# Patient Record
Sex: MALE | Race: BLACK OR AFRICAN AMERICAN | HISPANIC OR LATINO | URBAN - METROPOLITAN AREA
[De-identification: names, ages, dates, MRNs, and addresses within clinical notes are randomized per-mention and may not be internally consistent; named-entity substitution may affect disease eponyms.]

---

## 2019-05-15 PROBLEM — 441988000 IMAGING OF ABDOMEN ABNORMAL: Status: ACTIVE | Noted: 2019-05-15

## 2020-06-22 ENCOUNTER — OFFICE VISIT (OUTPATIENT)
Dept: URBAN - METROPOLITAN AREA CLINIC 35 | Facility: CLINIC | Age: 55
End: 2020-06-22

## 2020-06-22 VITALS
DIASTOLIC BLOOD PRESSURE: 68 MMHG | SYSTOLIC BLOOD PRESSURE: 138 MMHG | WEIGHT: 145 LBS | BODY MASS INDEX: 21.48 KG/M2 | HEIGHT: 69 IN

## 2020-06-22 RX ORDER — METFORMIN HYDROCHLORIDE 500 MG/1
TK 2 TS PO BID TABLET ORAL
Qty: 120 UNSPECIFIED | Refills: 0 | Status: DISCONTINUED | COMMUNITY

## 2020-06-22 NOTE — EXAM-MIGRATED EXAMINATIONS
GENERAL APPEARANCE: - alert, in no acute distress, well developed, well nourished;   HEAD: - normocephalic, atraumatic;   EYES: - SCLERA ECTERIC;   ORAL CAVITY: - mucosa moist;   THROAT: - clear;   HEART: - no murmurs, regular rate and rhythm, S1, S2 normal;   LUNGS: - clear to auscultation bilaterally, good air movement, no wheezes, rales, rhonchi;   ABDOMEN: - bowel sounds present, no masses palpable, no organomegaly , no rebound tenderness, soft, nontender, nondistended;

## 2020-06-22 NOTE — HPI-MIGRATED HPI
;     Abnormal ultrasound : Patient presents for Cirrhosis follow up. Patient denies having MRI due to approval . Patient is here today to get new MRI and DR Kaur states he will get it approved . Patient denies any abdominal pain at this time . Patient deneis nause or vomiting . He does mention gas / bloating more than before . Patient also had labs completed Friday with PCP we dont have results yet .     Patient admits drinking alcohol 1 x a week . Patient does have jaundice .  Patient denies fever ,chills , RUQ pains, dizziness, easy bruising, fatigue.        Of last visit (05/15/2019)  53 year old male patient presents for Abnormal Utrsound and Elevte LFT consult. Patient was diagnosed with Hepatopenomegy and fatty liver in 2016. Patient had last Liver biopsy in 2013 with results noted below. Patient currenty admits weight loss and jaundice .  Calculated MELD Score 17.   Patient ANI score 8.622, Probabity o Alchoholic disease 100%.     Patient currently denies chills, RUQ pains, dizziness, easy bruising, fatigue.   RISK FACTORS: Denies Alcohol, drugs, travel outside the [place], NSAIDs, protein supplements, tattoos, piercings,  service, blood transfusion, family history of liver disease or cancer, personal exposure to liver diseases, longterm, rehab    Outside Records recieved and reviewed: (04/10/2019) Hepatitis Panel: Negative  CBC: RBC is LOW at 3.55, MCH is HIGH at 35, Platelet Count is LOW at 79, all other values within normal range.   CMP: Creatnine is LOW at 0.3, B/C ratio is HIGH at 50, Glucose is VERY HIGH at 397, sodium is LOW at 134, albumin is LOW at 3.2, Globulin is HIGH at 4.8, ALK Phos is CRITICAL HIGH at 468, AST is HIGH at 127, ALT is HIGH at 90, Bilirubin, Total is CRITICAL HIGH at 10.10, All other values are normal range .    ABDOMINAL U/S(04/26/2019)  IMPRESSION:  1. Hepatomegaly, with mild fatty liver. 2.Splenomegaly 3.Small volume of ascites .;

## 2020-08-03 ENCOUNTER — TELEPHONE ENCOUNTER (OUTPATIENT)
Dept: URBAN - METROPOLITAN AREA CLINIC 37 | Facility: CLINIC | Age: 55
End: 2020-08-03

## 2020-08-05 ENCOUNTER — OFFICE VISIT (OUTPATIENT)
Dept: URBAN - METROPOLITAN AREA CLINIC 37 | Facility: CLINIC | Age: 55
End: 2020-08-05

## 2020-08-05 NOTE — HPI-MIGRATED HPI
;     Abnormal ultrasound : Patient presents for Cirrhosis follow up. Patient denies having MRI due to approval . Patient is here today to get new MRI and DR Kaur states he will get it approved . Patient denies any abdominal pain at this time . Patient deneis nause or vomiting . He does mention gas / bloating more than before . Patient also had labs completed Friday with PCP we dont have results yet .     Patient admits drinking alcohol 1 x a week . Patient does have jaundice .  Patient denies fever ,chills , RUQ pains, dizziness, easy bruising, fatigue.        Of last visit (05/15/2019)  53 year old male patient presents for Abnormal Utrsound and Elevte LFT consult. Patient was diagnosed with Hepatopenomegy and fatty liver in 2016. Patient had last Liver biopsy in 2013 with results noted below. Patient currenty admits weight loss and jaundice .  Calculated MELD Score 17.   Patient ANI score 8.622, Probabity o Alchoholic disease 100%.     Patient currently denies chills, RUQ pains, dizziness, easy bruising, fatigue.   RISK FACTORS: Denies Alcohol, drugs, travel outside the [place], NSAIDs, protein supplements, tattoos, piercings,  service, blood transfusion, family history of liver disease or cancer, personal exposure to liver diseases, nursing home, rehab    Outside Records recieved and reviewed: (04/10/2019) Hepatitis Panel: Negative  CBC: RBC is LOW at 3.55, MCH is HIGH at 35, Platelet Count is LOW at 79, all other values within normal range.   CMP: Creatnine is LOW at 0.3, B/C ratio is HIGH at 50, Glucose is VERY HIGH at 397, sodium is LOW at 134, albumin is LOW at 3.2, Globulin is HIGH at 4.8, ALK Phos is CRITICAL HIGH at 468, AST is HIGH at 127, ALT is HIGH at 90, Bilirubin, Total is CRITICAL HIGH at 10.10, All other values are normal range .    ABDOMINAL U/S(04/26/2019)  IMPRESSION:  1. Hepatomegaly, with mild fatty liver. 2.Splenomegaly 3.Small volume of ascites .;

## 2020-08-19 ENCOUNTER — OFFICE VISIT (OUTPATIENT)
Dept: URBAN - METROPOLITAN AREA CLINIC 37 | Facility: CLINIC | Age: 55
End: 2020-08-19

## 2020-08-19 ENCOUNTER — TELEPHONE ENCOUNTER (OUTPATIENT)
Dept: URBAN - METROPOLITAN AREA CLINIC 35 | Facility: CLINIC | Age: 55
End: 2020-08-19

## 2020-08-19 NOTE — HPI-MIGRATED HPI
;     Abnormal ultrasound : Patient presents today for an abnormal MRI follow up. MRI was performed 08/12/2020. Findings showed Cirrhotic liver and splenomegaly.   Patient admits/denies continuing alcohol 1 x a week. Patient denies fever, chills, RUQ pains, dizziness, easy bruising, or fatigue. Patient denies nausea or vomiting.  Of last visit (08/05/2020) Patient presents for Cirrhosis follow up. Patient denies having MRI due to approval . Patient is here today to get new MRI and DR Kaur states he will get it approved . Patient denies any abdominal pain at this time . Patient deneis nause or vomiting . He does mention gas / bloating more than before . Patient also had labs completed Friday with PCP we dont have results yet .     Patient admits drinking alcohol 1 x a week . Patient does have jaundice .  Patient denies fever ,chills , RUQ pains, dizziness, easy bruising, fatigue.        Of last visit (05/15/2019)  53 year old male patient presents for Abnormal Utrsound and Elevte LFT consult. Patient was diagnosed with Hepatopenomegy and fatty liver in 2016. Patient had last Liver biopsy in 2013 with results noted below. Patient currenty admits weight loss and jaundice .  Calculated MELD Score 17.   Patient ANI score 8.622, Probabity o Alchoholic disease 100%.     Patient currently denies chills, RUQ pains, dizziness, easy bruising, fatigue.   RISK FACTORS: Denies Alcohol, drugs, travel outside the [place], NSAIDs, protein supplements, tattoos, piercings,  service, blood transfusion, family history of liver disease or cancer, personal exposure to liver diseases, skilled nursing, rehab    Outside Records recieved and reviewed: (04/10/2019) Hepatitis Panel: Negative  CBC: RBC is LOW at 3.55, MCH is HIGH at 35, Platelet Count is LOW at 79, all other values within normal range.   CMP: Creatnine is LOW at 0.3, B/C ratio is HIGH at 50, Glucose is VERY HIGH at 397, sodium is LOW at 134, albumin is LOW at 3.2, Globulin is HIGH at 4.8, ALK Phos is CRITICAL HIGH at 468, AST is HIGH at 127, ALT is HIGH at 90, Bilirubin, Total is CRITICAL HIGH at 10.10, All other values are normal range .    ABDOMINAL U/S(04/26/2019)  IMPRESSION:  1. Hepatomegaly, with mild fatty liver. 2.Splenomegaly 3.Small volume of ascites .;

## 2020-08-28 ENCOUNTER — OFFICE VISIT (OUTPATIENT)
Dept: URBAN - METROPOLITAN AREA CLINIC 35 | Facility: CLINIC | Age: 55
End: 2020-08-28

## 2020-08-28 VITALS
SYSTOLIC BLOOD PRESSURE: 139 MMHG | WEIGHT: 145 LBS | DIASTOLIC BLOOD PRESSURE: 68 MMHG | HEIGHT: 69 IN | HEART RATE: 85 BPM | BODY MASS INDEX: 21.48 KG/M2

## 2020-08-28 NOTE — EXAM-MIGRATED EXAMINATIONS
GENERAL APPEARANCE: - alert, in no acute distress, well developed, well nourished;   EYES: - sclera Icteric;   ORAL CAVITY: - mucosa moist;   THROAT: - clear;   NECK/THYROID: - neck supple, full range of motion, no cervical lymphadenopathy, no thyroid nodules, no thyromegaly, trachea midline;   HEART: - no murmurs, regular rate and rhythm, S1, S2 normal;   LUNGS: - clear to auscultation bilaterally, good air movement, no wheezes, rales, rhonchi;   ABDOMEN: - bowel sounds present, no masses palpable, no organomegaly , no rebound tenderness, soft, nontender, nondistended;

## 2020-08-28 NOTE — HPI-MIGRATED HPI
;     Abnormal ultrasound : Patient presents today for an abnormal MRI follow up. MRI was performed 08/12/2020. Findings showed Cirrhotic liver and splenomegaly.   Patient denies continuing alcohol 1 x a week. Patient denies fever, chills, RUQ pains, dizziness, easy bruising, or fatigue. Patient denies nausea or vomiting.  Patient admits difficulty staying asleep at night, otherwise no new concerns.   Of last visit (08/05/2020) Patient presents for Cirrhosis follow up. Patient denies having MRI due to approval . Patient is here today to get new MRI and DR Kaur states he will get it approved . Patient denies any abdominal pain at this time . Patient deneis nause or vomiting . He does mention gas / bloating more than before . Patient also had labs completed Friday with PCP we dont have results yet .     Patient admits drinking alcohol 1 x a week . Patient does have jaundice .  Patient denies fever ,chills , RUQ pains, dizziness, easy bruising, fatigue.        Of last visit (05/15/2019)  53 year old male patient presents for Abnormal Utrsound and Elevte LFT consult. Patient was diagnosed with Hepatopenomegy and fatty liver in 2016. Patient had last Liver biopsy in 2013 with results noted below. Patient currenty admits weight loss and jaundice .  Calculated MELD Score 17.   Patient ANI score 8.622, Probabity o Alchoholic disease 100%.     Patient currently denies chills, RUQ pains, dizziness, easy bruising, fatigue.   RISK FACTORS: Denies Alcohol, drugs, travel outside the [place], NSAIDs, protein supplements, tattoos, piercings,  service, blood transfusion, family history of liver disease or cancer, personal exposure to liver diseases, assisted, rehab    Outside Records recieved and reviewed: (04/10/2019) Hepatitis Panel: Negative  CBC: RBC is LOW at 3.55, MCH is HIGH at 35, Platelet Count is LOW at 79, all other values within normal range.   CMP: Creatnine is LOW at 0.3, B/C ratio is HIGH at 50, Glucose is VERY HIGH at 397, sodium is LOW at 134, albumin is LOW at 3.2, Globulin is HIGH at 4.8, ALK Phos is CRITICAL HIGH at 468, AST is HIGH at 127, ALT is HIGH at 90, Bilirubin, Total is CRITICAL HIGH at 10.10, All other values are normal range .    ABDOMINAL U/S(04/26/2019)  IMPRESSION:  1. Hepatomegaly, with mild fatty liver. 2.Splenomegaly 3.Small volume of ascites .;

## 2020-10-14 ENCOUNTER — TELEPHONE ENCOUNTER (OUTPATIENT)
Dept: URBAN - METROPOLITAN AREA CLINIC 35 | Facility: CLINIC | Age: 55
End: 2020-10-14

## 2020-10-16 ENCOUNTER — OFFICE VISIT (OUTPATIENT)
Dept: URBAN - METROPOLITAN AREA CLINIC 35 | Facility: CLINIC | Age: 55
End: 2020-10-16

## 2020-11-18 ENCOUNTER — OFFICE VISIT (OUTPATIENT)
Dept: URBAN - METROPOLITAN AREA CLINIC 35 | Facility: CLINIC | Age: 55
End: 2020-11-18

## 2020-11-18 VITALS
WEIGHT: 140 LBS | BODY MASS INDEX: 20.73 KG/M2 | HEIGHT: 69 IN | TEMPERATURE: 98 F | HEART RATE: 95 BPM | OXYGEN SATURATION: 97 % | SYSTOLIC BLOOD PRESSURE: 146 MMHG | DIASTOLIC BLOOD PRESSURE: 68 MMHG

## 2020-11-18 RX ORDER — MULTIVIT-MIN/IRON/FOLIC ACID/K 18-600-40
AS DIRECTED CAPSULE ORAL
Status: ACTIVE | COMMUNITY

## 2020-11-18 NOTE — HPI-MIGRATED HPI
;     Abnormal ultrasound : Patient presents for follow up. Patient admits his last alcoholic drink was 2 weeks ago. Patient has increased MELD score according to Dr Ovalle of [place] .   Patient is Jaundice and has been experiencing bruising .  He admits intermittent abdominal pain.   he denies nausea or vomitng . He admits he is still having trouble sleeping .   Patient denies fever, chills, RUQ pains, dizziness, or fatigue.   Of last visit (08/28/2020) Patient presents today for an abnormal MRI follow up. MRI was performed 08/12/2020. Findings showed Cirrhotic liver and splenomegaly.   Patient denies continuing alcohol 1 x a week. Patient denies fever, chills, RUQ pains, dizziness, easy bruising, or fatigue. Patient denies nausea or vomiting.  Patient admits difficulty staying asleep at night, otherwise no new concerns.   Of last visit (08/05/2020) Patient presents for Cirrhosis follow up. Patient denies having MRI due to approval . Patient is here today to get new MRI and DR Kaur states he will get it approved . Patient denies any abdominal pain at this time . Patient deneis nause or vomiting . He does mention gas / bloating more than before . Patient also had labs completed Friday with PCP we dont have results yet .     Patient admits drinking alcohol 1 x a week . Patient does have jaundice .  Patient denies fever ,chills , RUQ pains, dizziness, easy bruising, fatigue.        Of last visit (05/15/2019)  53 year old male patient presents for Abnormal Utrsound and Elevte LFT consult. Patient was diagnosed with Hepatopenomegy and fatty liver in 2016. Patient had last Liver biopsy in 2013 with results noted below. Patient currenty admits weight loss and jaundice .  Calculated MELD Score 17.   Patient ANI score 8.622, Probabity o Alchoholic disease 100%.     Patient currently denies chills, RUQ pains, dizziness, easy bruising, fatigue.   RISK FACTORS: Denies Alcohol, drugs, travel outside the [place], NSAIDs, protein supplements, tattoos, piercings,  service, blood transfusion, family history of liver disease or cancer, personal exposure to liver diseases, assisted, rehab    Outside Records recieved and reviewed: (04/10/2019) Hepatitis Panel: Negative  CBC: RBC is LOW at 3.55, MCH is HIGH at 35, Platelet Count is LOW at 79, all other values within normal range.   CMP: Creatnine is LOW at 0.3, B/C ratio is HIGH at 50, Glucose is VERY HIGH at 397, sodium is LOW at 134, albumin is LOW at 3.2, Globulin is HIGH at 4.8, ALK Phos is CRITICAL HIGH at 468, AST is HIGH at 127, ALT is HIGH at 90, Bilirubin, Total is CRITICAL HIGH at 10.10, All other values are normal range .    ABDOMINAL U/S(04/26/2019)  IMPRESSION:  1. Hepatomegaly, with mild fatty liver. 2.Splenomegaly 3.Small volume of ascites .;

## 2020-11-18 NOTE — EXAM-MIGRATED EXAMINATIONS
GENERAL APPEARANCE: - alert, in no acute distress, well developed, well nourished;   EYES: - icteric;   ABDOMEN: - bowel sounds present, no masses palpable, no organomegaly , no rebound tenderness, soft, nontender, nondistended;

## 2022-11-11 ENCOUNTER — OUT OF OFFICE VISIT (OUTPATIENT)
Dept: URBAN - METROPOLITAN AREA MEDICAL CENTER 24 | Facility: MEDICAL CENTER | Age: 57
End: 2022-11-11
Payer: COMMERCIAL

## 2022-11-11 DIAGNOSIS — K31.89 ACQUIRED DEFORMITY OF DUODENUM: ICD-10-CM

## 2022-11-11 DIAGNOSIS — I85.10 ESOPH VARICE OTHER DIS: ICD-10-CM

## 2022-11-11 DIAGNOSIS — K70.30 ALC (ALCOHOLIC LIVER CIRRHOSIS): ICD-10-CM

## 2022-11-11 DIAGNOSIS — R41.82 ACUTE ON CHRONIC ALTERATION IN MENTAL STATUS: ICD-10-CM

## 2022-11-11 DIAGNOSIS — D53.8 OTHER SPECIFIED NUTRITIONAL ANEMIAS: ICD-10-CM

## 2022-11-11 PROCEDURE — G8427 DOCREV CUR MEDS BY ELIG CLIN: HCPCS | Performed by: INTERNAL MEDICINE

## 2022-11-11 PROCEDURE — 43244 EGD VARICES LIGATION: CPT | Performed by: INTERNAL MEDICINE

## 2022-11-11 PROCEDURE — 99222 1ST HOSP IP/OBS MODERATE 55: CPT | Performed by: INTERNAL MEDICINE

## 2022-12-16 ENCOUNTER — TELEPHONE ENCOUNTER (OUTPATIENT)
Dept: URBAN - METROPOLITAN AREA CLINIC 6 | Facility: CLINIC | Age: 57
End: 2022-12-16

## 2022-12-22 ENCOUNTER — OFFICE VISIT (OUTPATIENT)
Dept: URBAN - METROPOLITAN AREA CLINIC 82 | Facility: CLINIC | Age: 57
End: 2022-12-22
Payer: COMMERCIAL

## 2022-12-22 VITALS
RESPIRATION RATE: 20 BRPM | WEIGHT: 129 LBS | SYSTOLIC BLOOD PRESSURE: 120 MMHG | HEIGHT: 69 IN | HEART RATE: 80 BPM | BODY MASS INDEX: 19.11 KG/M2 | DIASTOLIC BLOOD PRESSURE: 76 MMHG | TEMPERATURE: 97.9 F

## 2022-12-22 DIAGNOSIS — R94.5 ABNORMAL RESULTS OF LIVER FUNCTION STUDIES: ICD-10-CM

## 2022-12-22 DIAGNOSIS — R16.2 HEPATOMEGALY WITH SPLENOMEGALY, NOT ELSEWHERE CLASSIFIED: ICD-10-CM

## 2022-12-22 DIAGNOSIS — K70.30 ALCOHOLIC CIRRHOSIS OF LIVER WITHOUT ASCITES: ICD-10-CM

## 2022-12-22 PROCEDURE — 99214 OFFICE O/P EST MOD 30 MIN: CPT | Performed by: INTERNAL MEDICINE

## 2022-12-22 RX ORDER — MULTIVIT-MIN/IRON/FOLIC ACID/K 18-600-40
AS DIRECTED CAPSULE ORAL
Status: ACTIVE | COMMUNITY

## 2022-12-22 NOTE — HPI-MIGRATED HPI
The patient is following up from recent hospitalization where he had EGD with banding of large esophageal varices.  Currently doing well with no evidence of bleeding.  Has been seen by Dr. Amos.     Abnormal ultrasound : Patient presents for follow up. Patient admits his last alcoholic drink was 2 weeks ago. Patient has increased MELD score according to Dr Ovalle of [place] .   Patient is Jaundice and has been experiencing bruising .  He admits intermittent abdominal pain.   he denies nausea or vomitng . He admits he is still having trouble sleeping .   Patient denies fever, chills, RUQ pains, dizziness, or fatigue.   Of last visit (08/28/2020) Patient presents today for an abnormal MRI follow up. MRI was performed 08/12/2020. Findings showed Cirrhotic liver and splenomegaly.   Patient denies continuing alcohol 1 x a week. Patient denies fever, chills, RUQ pains, dizziness, easy bruising, or fatigue. Patient denies nausea or vomiting.  Patient admits difficulty staying asleep at night, otherwise no new concerns.   Of last visit (08/05/2020) Patient presents for Cirrhosis follow up. Patient denies having MRI due to approval . Patient is here today to get new MRI and DR Kaur states he will get it approved . Patient denies any abdominal pain at this time . Patient deneis nause or vomiting . He does mention gas / bloating more than before . Patient also had labs completed Friday with PCP we dont have results yet .     Patient admits drinking alcohol 1 x a week . Patient does have jaundice .  Patient denies fever ,chills , RUQ pains, dizziness, easy bruising, fatigue.        Of last visit (05/15/2019)  53 year old male patient presents for Abnormal Utrsound and Elevte LFT consult. Patient was diagnosed with Hepatopenomegy and fatty liver in 2016. Patient had last Liver biopsy in 2013 with results noted below. Patient currenty admits weight loss and jaundice .  Calculated MELD Score 17.   Patient ANI score 8.622, Probabity o Alchoholic disease 100%.     Patient currently denies chills, RUQ pains, dizziness, easy bruising, fatigue.   RISK FACTORS: Denies Alcohol, drugs, travel outside the [place], NSAIDs, protein supplements, tattoos, piercings,  service, blood transfusion, family history of liver disease or cancer, personal exposure to liver diseases, [facility], rehab    Outside Records recieved and reviewed: (04/10/2019) Hepatitis Panel: Negative  CBC: RBC is LOW at 3.55, MCH is HIGH at 35, Platelet Count is LOW at 79, all other values within normal range.   CMP: Creatnine is LOW at 0.3, B/C ratio is HIGH at 50, Glucose is VERY HIGH at 397, sodium is LOW at 134, albumin is LOW at 3.2, Globulin is HIGH at 4.8, ALK Phos is CRITICAL HIGH at 468, AST is HIGH at 127, ALT is HIGH at 90, Bilirubin, Total is CRITICAL HIGH at 10.10, All other values are normal range .    ABDOMINAL U/S(04/26/2019)  IMPRESSION:  1. Hepatomegaly, with mild fatty liver. 2.Splenomegaly 3.Small volume of ascites .;

## 2023-01-19 ENCOUNTER — OFFICE VISIT (OUTPATIENT)
Dept: URBAN - METROPOLITAN AREA MEDICAL CENTER 24 | Facility: MEDICAL CENTER | Age: 58
End: 2023-01-19
Payer: COMMERCIAL

## 2023-01-19 DIAGNOSIS — I85.10 ESOPH VARICE OTHER DIS: ICD-10-CM

## 2023-01-19 DIAGNOSIS — K31.89 ACQUIRED DEFORMITY OF DUODENUM: ICD-10-CM

## 2023-01-19 DIAGNOSIS — K76.6 CLINICALLY SIGNIFICANT PORTAL HYPERTENSION: ICD-10-CM

## 2023-01-19 PROCEDURE — 43244 EGD VARICES LIGATION: CPT | Performed by: INTERNAL MEDICINE

## 2023-01-30 ENCOUNTER — TELEPHONE ENCOUNTER (OUTPATIENT)
Dept: URBAN - METROPOLITAN AREA CLINIC 42 | Facility: CLINIC | Age: 58
End: 2023-01-30

## 2023-02-23 ENCOUNTER — OFFICE VISIT (OUTPATIENT)
Dept: URBAN - METROPOLITAN AREA CLINIC 82 | Facility: CLINIC | Age: 58
End: 2023-02-23
Payer: COMMERCIAL

## 2023-02-23 ENCOUNTER — DASHBOARD ENCOUNTERS (OUTPATIENT)
Age: 58
End: 2023-02-23

## 2023-02-23 ENCOUNTER — TELEPHONE ENCOUNTER (OUTPATIENT)
Dept: URBAN - METROPOLITAN AREA CLINIC 82 | Facility: CLINIC | Age: 58
End: 2023-02-23

## 2023-02-23 VITALS
TEMPERATURE: 97.5 F | BODY MASS INDEX: 20.73 KG/M2 | RESPIRATION RATE: 16 BRPM | HEART RATE: 85 BPM | DIASTOLIC BLOOD PRESSURE: 67 MMHG | SYSTOLIC BLOOD PRESSURE: 110 MMHG | WEIGHT: 140 LBS | HEIGHT: 69 IN

## 2023-02-23 DIAGNOSIS — R16.2 HEPATOMEGALY WITH SPLENOMEGALY, NOT ELSEWHERE CLASSIFIED: ICD-10-CM

## 2023-02-23 DIAGNOSIS — K70.30 ALCOHOLIC CIRRHOSIS OF LIVER WITHOUT ASCITES: ICD-10-CM

## 2023-02-23 DIAGNOSIS — R94.5 ABNORMAL RESULTS OF LIVER FUNCTION STUDIES: ICD-10-CM

## 2023-02-23 PROBLEM — 166603001 ABNORMAL RESULTS OF LIVER FUNCTION STUDIES: Status: ACTIVE | Noted: 2019-05-15

## 2023-02-23 PROBLEM — 36760000 HEPATOSPLENOMEGALY: Status: ACTIVE | Noted: 2019-05-15

## 2023-02-23 PROCEDURE — 99214 OFFICE O/P EST MOD 30 MIN: CPT | Performed by: INTERNAL MEDICINE

## 2023-02-23 RX ORDER — FUROSEMIDE 80 MG/1
1 TABLET TABLET ORAL ONCE A DAY
Qty: 30 | Refills: 6 | OUTPATIENT

## 2023-02-23 RX ORDER — SPIRONOLACTONE 100 MG/1
1 TABLET TABLET, FILM COATED ORAL ONCE A DAY
Qty: 30 | Refills: 6 | OUTPATIENT

## 2023-02-23 RX ORDER — MULTIVIT-MIN/IRON/FOLIC ACID/K 18-600-40
AS DIRECTED CAPSULE ORAL
Status: ACTIVE | COMMUNITY

## 2023-02-23 RX ORDER — ATORVASTATIN CALCIUM 20 MG/1
1 TABLET TABLET, FILM COATED ORAL ONCE A DAY
Status: ACTIVE | COMMUNITY

## 2023-02-23 RX ORDER — LACTULOSE 10 G/15ML
15 ML AS NEEDED SOLUTION ORAL ONCE A DAY
Status: ACTIVE | COMMUNITY

## 2023-02-23 RX ORDER — INSULIN GLARGINE 100 [IU]/ML
AS DIRECTED INJECTION, SOLUTION SUBCUTANEOUS
Status: ACTIVE | COMMUNITY

## 2023-02-23 RX ORDER — INSULIN LISPRO 100 [IU]/ML
AS DIRECTED INJECTION, SOLUTION INTRAVENOUS; SUBCUTANEOUS
Status: ACTIVE | COMMUNITY

## 2023-02-23 NOTE — HPI-TODAY'S VISIT:
The patient is a 58 yo male following up after recent EGD with banding.  EGD showed significant improvement in the amount of varices present.  Getting transplant evaluation at [place].

## 2023-02-24 PROBLEM — 420054005 ALCOHOLIC CIRRHOSIS: Status: ACTIVE | Noted: 2020-08-28

## 2023-03-07 ENCOUNTER — OFFICE VISIT (OUTPATIENT)
Dept: URBAN - METROPOLITAN AREA SURGERY CENTER 13 | Facility: SURGERY CENTER | Age: 58
End: 2023-03-07

## 2023-03-16 ENCOUNTER — OFFICE VISIT (OUTPATIENT)
Dept: URBAN - METROPOLITAN AREA MEDICAL CENTER 24 | Facility: MEDICAL CENTER | Age: 58
End: 2023-03-16
Payer: COMMERCIAL

## 2023-03-16 DIAGNOSIS — D12.4 ADENOMA OF DESCENDING COLON: ICD-10-CM

## 2023-03-16 DIAGNOSIS — I85.10 ESOPH VARICE OTHER DIS: ICD-10-CM

## 2023-03-16 DIAGNOSIS — Z12.11 COLON CANCER SCREENING: ICD-10-CM

## 2023-03-16 DIAGNOSIS — K76.6 CLINICALLY SIGNIFICANT PORTAL HYPERTENSION: ICD-10-CM

## 2023-03-16 DIAGNOSIS — K31.89 ACQUIRED DEFORMITY OF DUODENUM: ICD-10-CM

## 2023-03-16 PROCEDURE — 43244 EGD VARICES LIGATION: CPT | Performed by: INTERNAL MEDICINE

## 2023-03-16 PROCEDURE — 45385 COLONOSCOPY W/LESION REMOVAL: CPT | Performed by: INTERNAL MEDICINE

## 2023-03-17 ENCOUNTER — TELEPHONE ENCOUNTER (OUTPATIENT)
Dept: URBAN - METROPOLITAN AREA CLINIC 42 | Facility: CLINIC | Age: 58
End: 2023-03-17

## 2023-03-22 ENCOUNTER — P2P PATIENT RECORD (OUTPATIENT)
Age: 58
End: 2023-03-22

## 2023-03-27 ENCOUNTER — OUT OF OFFICE VISIT (OUTPATIENT)
Dept: URBAN - METROPOLITAN AREA MEDICAL CENTER 24 | Facility: MEDICAL CENTER | Age: 58
End: 2023-03-27
Payer: COMMERCIAL

## 2023-03-27 DIAGNOSIS — K76.6 CLINICALLY SIGNIFICANT PORTAL HYPERTENSION: ICD-10-CM

## 2023-03-27 DIAGNOSIS — K70.30 ALC (ALCOHOLIC LIVER CIRRHOSIS): ICD-10-CM

## 2023-03-27 DIAGNOSIS — K92.0 BLOODY EMESIS: ICD-10-CM

## 2023-03-27 DIAGNOSIS — K31.89 ACQUIRED DEFORMITY OF DUODENUM: ICD-10-CM

## 2023-03-27 DIAGNOSIS — D62 ABLA (ACUTE BLOOD LOSS ANEMIA): ICD-10-CM

## 2023-03-27 PROCEDURE — 43235 EGD DIAGNOSTIC BRUSH WASH: CPT | Performed by: INTERNAL MEDICINE

## 2023-03-27 PROCEDURE — 99222 1ST HOSP IP/OBS MODERATE 55: CPT | Performed by: INTERNAL MEDICINE

## 2023-03-27 PROCEDURE — G8427 DOCREV CUR MEDS BY ELIG CLIN: HCPCS | Performed by: INTERNAL MEDICINE

## 2023-03-28 ENCOUNTER — TELEPHONE ENCOUNTER (OUTPATIENT)
Dept: URBAN - METROPOLITAN AREA CLINIC 35 | Facility: CLINIC | Age: 58
End: 2023-03-28

## 2023-03-28 ENCOUNTER — LAB OUTSIDE AN ENCOUNTER (OUTPATIENT)
Dept: URBAN - METROPOLITAN AREA CLINIC 35 | Facility: CLINIC | Age: 58
End: 2023-03-28

## 2023-03-28 ENCOUNTER — OUT OF OFFICE VISIT (OUTPATIENT)
Dept: URBAN - METROPOLITAN AREA MEDICAL CENTER 24 | Facility: MEDICAL CENTER | Age: 58
End: 2023-03-28
Payer: COMMERCIAL

## 2023-03-28 ENCOUNTER — TELEPHONE ENCOUNTER (OUTPATIENT)
Dept: URBAN - METROPOLITAN AREA CLINIC 92 | Facility: CLINIC | Age: 58
End: 2023-03-28

## 2023-03-28 DIAGNOSIS — K70.30 ALC (ALCOHOLIC LIVER CIRRHOSIS): ICD-10-CM

## 2023-03-28 DIAGNOSIS — D62 ABLA (ACUTE BLOOD LOSS ANEMIA): ICD-10-CM

## 2023-03-28 DIAGNOSIS — K92.0 BLOODY EMESIS: ICD-10-CM

## 2023-03-28 PROCEDURE — 99232 SBSQ HOSP IP/OBS MODERATE 35: CPT | Performed by: INTERNAL MEDICINE

## 2023-03-30 ENCOUNTER — OUT OF OFFICE VISIT (OUTPATIENT)
Dept: URBAN - METROPOLITAN AREA MEDICAL CENTER 24 | Facility: MEDICAL CENTER | Age: 58
End: 2023-03-30
Payer: COMMERCIAL

## 2023-03-30 DIAGNOSIS — K92.0 BLOODY EMESIS: ICD-10-CM

## 2023-03-30 DIAGNOSIS — D62 ABLA (ACUTE BLOOD LOSS ANEMIA): ICD-10-CM

## 2023-03-30 DIAGNOSIS — K76.82 ACUTE HEPATIC ENCEPHALOPATHY: ICD-10-CM

## 2023-03-30 DIAGNOSIS — K70.30 ALC (ALCOHOLIC LIVER CIRRHOSIS): ICD-10-CM

## 2023-03-30 PROCEDURE — 99232 SBSQ HOSP IP/OBS MODERATE 35: CPT | Performed by: INTERNAL MEDICINE

## 2023-03-31 ENCOUNTER — OUT OF OFFICE VISIT (OUTPATIENT)
Dept: URBAN - METROPOLITAN AREA MEDICAL CENTER 24 | Facility: MEDICAL CENTER | Age: 58
End: 2023-03-31
Payer: COMMERCIAL

## 2023-03-31 ENCOUNTER — TELEPHONE ENCOUNTER (OUTPATIENT)
Dept: URBAN - METROPOLITAN AREA CLINIC 92 | Facility: CLINIC | Age: 58
End: 2023-03-31

## 2023-03-31 DIAGNOSIS — D62 ABLA (ACUTE BLOOD LOSS ANEMIA): ICD-10-CM

## 2023-03-31 DIAGNOSIS — K92.0 BLOODY EMESIS: ICD-10-CM

## 2023-03-31 DIAGNOSIS — K70.30 ALC (ALCOHOLIC LIVER CIRRHOSIS): ICD-10-CM

## 2023-03-31 DIAGNOSIS — K76.82 ACUTE HEPATIC ENCEPHALOPATHY: ICD-10-CM

## 2023-03-31 PROCEDURE — 99232 SBSQ HOSP IP/OBS MODERATE 35: CPT | Performed by: INTERNAL MEDICINE

## 2023-04-01 ENCOUNTER — OUT OF OFFICE VISIT (OUTPATIENT)
Dept: URBAN - METROPOLITAN AREA MEDICAL CENTER 24 | Facility: MEDICAL CENTER | Age: 58
End: 2023-04-01

## 2023-04-01 ENCOUNTER — CLAIMS CREATED FROM THE CLAIM WINDOW (OUTPATIENT)
Dept: URBAN - METROPOLITAN AREA MEDICAL CENTER 24 | Facility: MEDICAL CENTER | Age: 58
End: 2023-04-01
Payer: COMMERCIAL

## 2023-04-01 DIAGNOSIS — D62 ABLA (ACUTE BLOOD LOSS ANEMIA): ICD-10-CM

## 2023-04-01 DIAGNOSIS — K92.0 BLOODY EMESIS: ICD-10-CM

## 2023-04-01 DIAGNOSIS — K76.82 ACUTE HEPATIC ENCEPHALOPATHY: ICD-10-CM

## 2023-04-01 DIAGNOSIS — K70.30 ALC (ALCOHOLIC LIVER CIRRHOSIS): ICD-10-CM

## 2023-04-01 PROCEDURE — 99232 SBSQ HOSP IP/OBS MODERATE 35: CPT | Performed by: INTERNAL MEDICINE

## 2023-05-01 ENCOUNTER — TELEPHONE ENCOUNTER (OUTPATIENT)
Dept: URBAN - METROPOLITAN AREA CLINIC 35 | Facility: CLINIC | Age: 58
End: 2023-05-01

## 2023-06-02 ENCOUNTER — CLAIMS CREATED FROM THE CLAIM WINDOW (OUTPATIENT)
Dept: URBAN - METROPOLITAN AREA MEDICAL CENTER 31 | Facility: MEDICAL CENTER | Age: 58
End: 2023-06-02
Payer: COMMERCIAL

## 2023-06-02 ENCOUNTER — CLAIMS CREATED FROM THE CLAIM WINDOW (OUTPATIENT)
Dept: URBAN - METROPOLITAN AREA MEDICAL CENTER 31 | Facility: MEDICAL CENTER | Age: 58
End: 2023-06-02

## 2023-06-02 DIAGNOSIS — K76.82 ACUTE HEPATIC ENCEPHALOPATHY: ICD-10-CM

## 2023-06-02 DIAGNOSIS — R11.2 ACUTE NAUSEA WITH NONBILIOUS VOMITING: ICD-10-CM

## 2023-06-02 DIAGNOSIS — K74.60 ADVANCED CIRRHOSIS: ICD-10-CM

## 2023-06-02 PROCEDURE — G8427 DOCREV CUR MEDS BY ELIG CLIN: HCPCS | Performed by: INTERNAL MEDICINE

## 2023-06-02 PROCEDURE — 99222 1ST HOSP IP/OBS MODERATE 55: CPT | Performed by: INTERNAL MEDICINE

## 2023-06-16 ENCOUNTER — OFFICE VISIT (OUTPATIENT)
Dept: URBAN - METROPOLITAN AREA MEDICAL CENTER 33 | Facility: MEDICAL CENTER | Age: 58
End: 2023-06-16
Payer: COMMERCIAL

## 2023-06-16 DIAGNOSIS — K31.89 ACQUIRED DEFORMITY OF DUODENUM: ICD-10-CM

## 2023-06-16 PROCEDURE — 43239 EGD BIOPSY SINGLE/MULTIPLE: CPT | Performed by: INTERNAL MEDICINE

## 2023-06-16 PROCEDURE — 43237 ENDOSCOPIC US EXAM ESOPH: CPT | Performed by: INTERNAL MEDICINE

## 2023-08-04 ENCOUNTER — TELEPHONE ENCOUNTER (OUTPATIENT)
Dept: URBAN - METROPOLITAN AREA CLINIC 105 | Facility: CLINIC | Age: 58
End: 2023-08-04

## 2025-01-20 ENCOUNTER — CLAIMS CREATED FROM THE CLAIM WINDOW (OUTPATIENT)
Dept: URBAN - METROPOLITAN AREA MEDICAL CENTER 33 | Facility: MEDICAL CENTER | Age: 60
End: 2025-01-20
Payer: COMMERCIAL

## 2025-01-20 DIAGNOSIS — K70.30 ALC (ALCOHOLIC LIVER CIRRHOSIS): ICD-10-CM

## 2025-01-20 DIAGNOSIS — D62 ABLA (ACUTE BLOOD LOSS ANEMIA): ICD-10-CM

## 2025-01-20 DIAGNOSIS — K92.1 ACUTE MELENA: ICD-10-CM

## 2025-01-20 PROCEDURE — 99223 1ST HOSP IP/OBS HIGH 75: CPT | Performed by: INTERNAL MEDICINE

## 2025-01-20 PROCEDURE — G8427 DOCREV CUR MEDS BY ELIG CLIN: HCPCS | Performed by: INTERNAL MEDICINE

## 2025-01-20 PROCEDURE — 99255 IP/OBS CONSLTJ NEW/EST HI 80: CPT | Performed by: INTERNAL MEDICINE

## 2025-01-21 ENCOUNTER — CLAIMS CREATED FROM THE CLAIM WINDOW (OUTPATIENT)
Dept: URBAN - METROPOLITAN AREA MEDICAL CENTER 33 | Facility: MEDICAL CENTER | Age: 60
End: 2025-01-21
Payer: COMMERCIAL

## 2025-01-21 DIAGNOSIS — K92.2 GASTROINTESTINAL HEMORRHAGE, UNSPECIFIED: ICD-10-CM

## 2025-01-21 PROCEDURE — 99233 SBSQ HOSP IP/OBS HIGH 50: CPT | Performed by: INTERNAL MEDICINE

## 2025-01-22 ENCOUNTER — CLAIMS CREATED FROM THE CLAIM WINDOW (OUTPATIENT)
Dept: URBAN - METROPOLITAN AREA MEDICAL CENTER 33 | Facility: MEDICAL CENTER | Age: 60
End: 2025-01-22
Payer: COMMERCIAL

## 2025-01-22 DIAGNOSIS — K92.2 GASTROINTESTINAL HEMORRHAGE, UNSPECIFIED: ICD-10-CM

## 2025-01-22 PROCEDURE — 99232 SBSQ HOSP IP/OBS MODERATE 35: CPT

## 2025-03-06 ENCOUNTER — CLAIMS CREATED FROM THE CLAIM WINDOW (OUTPATIENT)
Dept: URBAN - METROPOLITAN AREA MEDICAL CENTER 33 | Facility: MEDICAL CENTER | Age: 60
End: 2025-03-06
Payer: COMMERCIAL

## 2025-03-06 DIAGNOSIS — K92.2 GASTROINTESTINAL HEMORRHAGE, UNSPECIFIED: ICD-10-CM

## 2025-03-06 PROCEDURE — 99255 IP/OBS CONSLTJ NEW/EST HI 80: CPT | Performed by: INTERNAL MEDICINE

## 2025-03-07 ENCOUNTER — CLAIMS CREATED FROM THE CLAIM WINDOW (OUTPATIENT)
Dept: URBAN - METROPOLITAN AREA MEDICAL CENTER 33 | Facility: MEDICAL CENTER | Age: 60
End: 2025-03-07
Payer: COMMERCIAL

## 2025-03-07 DIAGNOSIS — K92.2 GASTROINTESTINAL HEMORRHAGE, UNSPECIFIED: ICD-10-CM

## 2025-03-07 PROCEDURE — 43235 EGD DIAGNOSTIC BRUSH WASH: CPT | Performed by: INTERNAL MEDICINE

## 2025-05-15 ENCOUNTER — CLAIMS CREATED FROM THE CLAIM WINDOW (OUTPATIENT)
Dept: URBAN - METROPOLITAN AREA MEDICAL CENTER 31 | Facility: MEDICAL CENTER | Age: 60
End: 2025-05-15
Payer: COMMERCIAL

## 2025-05-15 DIAGNOSIS — R41.82 ALTERED MENTAL STATUS: ICD-10-CM

## 2025-05-15 DIAGNOSIS — K62.5 RECTAL BLEEDING: ICD-10-CM

## 2025-05-15 DIAGNOSIS — R19.7 DIARRHEA: ICD-10-CM

## 2025-05-15 PROCEDURE — G8427 DOCREV CUR MEDS BY ELIG CLIN: HCPCS | Performed by: STUDENT IN AN ORGANIZED HEALTH CARE EDUCATION/TRAINING PROGRAM

## 2025-05-15 PROCEDURE — 99254 IP/OBS CNSLTJ NEW/EST MOD 60: CPT | Performed by: STUDENT IN AN ORGANIZED HEALTH CARE EDUCATION/TRAINING PROGRAM

## 2025-05-15 PROCEDURE — 99222 1ST HOSP IP/OBS MODERATE 55: CPT | Performed by: STUDENT IN AN ORGANIZED HEALTH CARE EDUCATION/TRAINING PROGRAM

## 2025-05-16 ENCOUNTER — CLAIMS CREATED FROM THE CLAIM WINDOW (OUTPATIENT)
Dept: URBAN - METROPOLITAN AREA MEDICAL CENTER 31 | Facility: MEDICAL CENTER | Age: 60
End: 2025-05-16

## 2025-05-16 PROCEDURE — 99232 SBSQ HOSP IP/OBS MODERATE 35: CPT | Performed by: INTERNAL MEDICINE

## 2025-07-19 ENCOUNTER — CLAIMS CREATED FROM THE CLAIM WINDOW (OUTPATIENT)
Dept: URBAN - METROPOLITAN AREA MEDICAL CENTER 31 | Facility: MEDICAL CENTER | Age: 60
End: 2025-07-19
Payer: COMMERCIAL

## 2025-07-19 DIAGNOSIS — K76.82 HEPATIC ENCEPHALOPATHY: ICD-10-CM

## 2025-07-19 DIAGNOSIS — K74.60 DECOMPENSATED CIRRHOSIS: ICD-10-CM

## 2025-07-19 PROCEDURE — 99222 1ST HOSP IP/OBS MODERATE 55: CPT | Performed by: INTERNAL MEDICINE

## 2025-07-19 PROCEDURE — 99254 IP/OBS CNSLTJ NEW/EST MOD 60: CPT | Performed by: INTERNAL MEDICINE

## 2025-07-19 PROCEDURE — G8427 DOCREV CUR MEDS BY ELIG CLIN: HCPCS | Performed by: INTERNAL MEDICINE

## 2025-07-20 ENCOUNTER — CLAIMS CREATED FROM THE CLAIM WINDOW (OUTPATIENT)
Dept: URBAN - METROPOLITAN AREA MEDICAL CENTER 31 | Facility: MEDICAL CENTER | Age: 60
End: 2025-07-20
Payer: COMMERCIAL

## 2025-07-20 DIAGNOSIS — K76.82 HEPATIC ENCEPHALOPATHY: ICD-10-CM

## 2025-07-20 DIAGNOSIS — K74.60 DECOMPENSATED CIRRHOSIS: ICD-10-CM

## 2025-07-20 PROCEDURE — 99232 SBSQ HOSP IP/OBS MODERATE 35: CPT | Performed by: INTERNAL MEDICINE

## 2025-07-21 ENCOUNTER — CLAIMS CREATED FROM THE CLAIM WINDOW (OUTPATIENT)
Dept: URBAN - METROPOLITAN AREA MEDICAL CENTER 31 | Facility: MEDICAL CENTER | Age: 60
End: 2025-07-21
Payer: COMMERCIAL

## 2025-07-21 DIAGNOSIS — K74.60 DECOMPENSATED CIRRHOSIS: ICD-10-CM

## 2025-07-21 DIAGNOSIS — K76.82 HEPATIC ENCEPHALOPATHY: ICD-10-CM

## 2025-07-21 DIAGNOSIS — D64.89 NORMOCYTIC ANEMIA: ICD-10-CM

## 2025-07-21 PROCEDURE — 99232 SBSQ HOSP IP/OBS MODERATE 35: CPT | Performed by: INTERNAL MEDICINE

## 2025-07-22 ENCOUNTER — CLAIMS CREATED FROM THE CLAIM WINDOW (OUTPATIENT)
Dept: URBAN - METROPOLITAN AREA MEDICAL CENTER 31 | Facility: MEDICAL CENTER | Age: 60
End: 2025-07-22
Payer: COMMERCIAL

## 2025-07-22 DIAGNOSIS — K76.82 HEPATIC ENCEPHALOPATHY: ICD-10-CM

## 2025-07-22 DIAGNOSIS — K74.60 DECOMPENSATED CIRRHOSIS: ICD-10-CM

## 2025-07-22 DIAGNOSIS — D64.89 NORMOCYTIC ANEMIA: ICD-10-CM

## 2025-07-22 PROCEDURE — 99232 SBSQ HOSP IP/OBS MODERATE 35: CPT | Performed by: INTERNAL MEDICINE

## 2025-08-14 ENCOUNTER — OFFICE VISIT (OUTPATIENT)
Dept: URBAN - METROPOLITAN AREA CLINIC 42 | Facility: CLINIC | Age: 60
End: 2025-08-14